# Patient Record
Sex: MALE | Race: WHITE | NOT HISPANIC OR LATINO | Employment: STUDENT | URBAN - METROPOLITAN AREA
[De-identification: names, ages, dates, MRNs, and addresses within clinical notes are randomized per-mention and may not be internally consistent; named-entity substitution may affect disease eponyms.]

---

## 2020-02-27 PROBLEM — Z76.89 ENCOUNTER TO ESTABLISH CARE: Status: ACTIVE | Noted: 2020-02-27

## 2020-02-27 PROBLEM — Z00.00 ANNUAL PHYSICAL EXAM: Status: ACTIVE | Noted: 2020-02-27

## 2020-02-28 ENCOUNTER — OFFICE VISIT (OUTPATIENT)
Dept: INTERNAL MEDICINE CLINIC | Facility: CLINIC | Age: 20
End: 2020-02-28
Payer: COMMERCIAL

## 2020-02-28 VITALS
HEIGHT: 73 IN | OXYGEN SATURATION: 98 % | WEIGHT: 150 LBS | SYSTOLIC BLOOD PRESSURE: 104 MMHG | DIASTOLIC BLOOD PRESSURE: 72 MMHG | BODY MASS INDEX: 19.88 KG/M2 | HEART RATE: 79 BPM | TEMPERATURE: 98 F

## 2020-02-28 DIAGNOSIS — Z00.00 ANNUAL PHYSICAL EXAM: ICD-10-CM

## 2020-02-28 DIAGNOSIS — Z11.1 SCREENING-PULMONARY TB: ICD-10-CM

## 2020-02-28 DIAGNOSIS — Z76.89 ENCOUNTER TO ESTABLISH CARE: Primary | ICD-10-CM

## 2020-02-28 DIAGNOSIS — R25.1 OCCASIONAL TREMORS: ICD-10-CM

## 2020-02-28 DIAGNOSIS — Z28.21 INFLUENZA VACCINATION DECLINED BY PATIENT: ICD-10-CM

## 2020-02-28 PROCEDURE — 99385 PREV VISIT NEW AGE 18-39: CPT | Performed by: INTERNAL MEDICINE

## 2020-02-28 NOTE — PROGRESS NOTES
Assessment/Plan:    Encounter to establish care  - patient has established care with us today and his mother will bring his medical records from his pediatrician  Annual physical exam  - History and physical examination done  - Pt was counseled to eat a heart healthy diet, to drink at least 2 L of water daily, to take a daily multivitamin and to exercise for at least 30 minutes of cardio exercise daily, for at least 5 days a week  - CBC, CMP, TSH and lipid panel have been ordered today will follow up with the results  - he is up-to-date with his tetanus vaccine but did not get the flu shot because the family does not believe in the flu shot  - he was counseled to continue with monthly self testicular exams and to report to the office if he notices any lump, swelling on nodule  - follow up in 1 year for another annual physical exam or sooner if needed  Need for influenza vaccination  - patient declines a flu shot because his family does not believe in it  Normal BMI  - patient's BMI is 19 99  - continue with diet and exercise as usual    Screening for pulmonary TB  - 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 be 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 for pulmonary TB    -will order a QuantiFERON gold test    Tremors  - will check a TSH and fasting glucose     Diagnoses and all orders for this visit:    Encounter to establish care    Annual physical exam  -     CBC and differential; Future  -     Comprehensive metabolic panel; Future  -     TSH, 3rd generation with Free T4 reflex; Future  -     Lipid panel; Future    Screening-pulmonary TB  -     Quantiferon TB Gold Plus; Future    Influenza vaccination declined by patient    Body mass index (BMI) 19 9 or less, adult    Occasional tremors          Subjective:      Patient ID: Dana Vargas is a 23 y o  male  HPI  Patient presents to establish care  He has no health problems except for seasonal allergies    He only takes antihistamines and Flonase during allergy season which is mostly in the spring  He also presents  for an annual physical exam   Last annual physical exam- years ago  Past medical history- seasonal allergies,   Past surgical history- none  Medications- occasional allegra and flonase, used to be on allergy shots  Allergies- NKDA  Diet- mix of balanced diet and junk  He does not drink enough water  Exercise- biking and golf, about an hour a day  Alcohol use- none  Caffeine and soda use-he does not drink coffee, but drinks snapple which contains Tea once a day and also drinks soda about 3-4 a week  Nicotine use- never  Recreational drug use- never  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  Sexual history, STD history and HIV testing- not sexually active, never had an STD, no HIV testing  Gynecological history/Prostate health/testicular health history-self-testicular exam at least once a month  Colonoscopy- N/A  Immunization history- no flu shot - last tdap - 2014  Dental visit- twice a year  Family history- HTN - parents and grandparents on mon's side, DM - grandparents, grandfathers and grandma - MI and heart ds on moms side of the family  grandmoms - pancreatic ca( dad)  and bladder ca( mom)  Today, patient admits to nasal congestion and tremors which he states has been going on for a long time and are worse when he is concentrating to perform a small task  He also admits to arthralgias in the bilateral knees and elbow with a history of elbow fracture  He denies fever, chills, night sweats, chest pain, shortness of breath, palpitations, nausea, vomiting abdominal pain, diarrhea, constipation  There is no family history of essential tremors and patient does not know how alcohol affects the tremors  The following portions of the patient's history were reviewed and updated as appropriate:   He  has no past medical history on file    He   Patient Active Problem List    Diagnosis Date Noted    Screening-pulmonary TB 02/28/2020    Influenza vaccination declined by patient 02/28/2020    Body mass index (BMI) 19 9 or less, adult 02/28/2020    Occasional tremors 02/28/2020    Encounter to establish care 02/27/2020    Annual physical exam 02/27/2020     He  has no past surgical history on file  His family history includes Diabetes in his maternal grandfather, maternal grandmother, paternal grandfather, and paternal grandmother; Heart disease in his maternal grandfather and maternal grandmother; Hypertension in his father, maternal grandfather, maternal grandmother, and mother  He  reports that he has never smoked  He has never used smokeless tobacco  He reports that he does not drink alcohol or use drugs  No current outpatient medications on file  No current facility-administered medications for this visit  No current outpatient medications on file prior to visit  No current facility-administered medications on file prior to visit  He is allergic to grass extracts [gramineae pollens]       Review of Systems   Constitutional: Negative for activity change, chills, fatigue, fever and unexpected weight change  HENT: Positive for congestion  Negative for ear pain, postnasal drip, rhinorrhea, sinus pressure and sore throat  Eyes: Negative for pain  Respiratory: Negative for cough, choking, chest tightness, shortness of breath and wheezing  Cardiovascular: Negative for chest pain, palpitations and leg swelling  Gastrointestinal: Negative for abdominal pain, constipation, diarrhea, nausea and vomiting  Genitourinary: Negative for dysuria and hematuria  Musculoskeletal: Positive for arthralgias (knees and elbows)  Negative for back pain, gait problem, joint swelling, myalgias and neck stiffness  Skin: Negative for pallor and rash  Neurological: Positive for tremors  Negative for dizziness, seizures, syncope, light-headedness and headaches  Hematological: Negative for adenopathy     Psychiatric/Behavioral: Negative for behavioral problems  Objective:      /72 (BP Location: Left arm, Patient Position: Sitting, Cuff Size: Standard)   Pulse 79   Temp 98 °F (36 7 °C) (Oral)   Ht 6' 0 64" (1 845 m)   Wt 68 kg (150 lb)   SpO2 98%   BMI 19 99 kg/m²          Physical Exam   Constitutional: He is oriented to person, place, and time  He appears well-developed and well-nourished  No distress  HENT:   Head: Normocephalic and atraumatic  Right Ear: External ear normal    Left Ear: External ear normal    Nose: Mucosal edema (Nasal mucosa erythema and edema with swollen turbinates) present  Mouth/Throat: Posterior oropharyngeal edema and posterior oropharyngeal erythema present  No oropharyngeal exudate  Eyes: Pupils are equal, round, and reactive to light  Conjunctivae and EOM are normal  Right eye exhibits no discharge  Left eye exhibits no discharge  No scleral icterus  Neck: Normal range of motion  Neck supple  No JVD present  No tracheal deviation present  No thyromegaly present  Cardiovascular: Normal rate, regular rhythm, normal heart sounds and intact distal pulses  Exam reveals no gallop and no friction rub  No murmur heard  Pulmonary/Chest: Effort normal and breath sounds normal  No respiratory distress  He has no wheezes  He has no rales  He exhibits no tenderness  Abdominal: Soft  Bowel sounds are normal  He exhibits no distension and no mass  There is no tenderness  There is no rebound and no guarding  Musculoskeletal: Normal range of motion  He exhibits no edema, tenderness or deformity  Lymphadenopathy:     He has no cervical adenopathy  Neurological: He is alert and oriented to person, place, and time  He has normal reflexes  No cranial nerve deficit  He exhibits normal muscle tone   Coordination normal    Cranial nerves 2-12 intact bilaterally  Muscle strength is 5/5 in all extremities  Sensation is intact in bilateral face, upper and lower extremities  Rapid alternating movements and finger-to-nose pointing test are intact  Deep tendon reflexes at 2+ bilaterally  Gait is intact   Skin: Skin is warm and dry  No rash noted  He is not diaphoretic  No erythema  No pallor  Psychiatric: He has a normal mood and affect  His behavior is normal          No results found for any previous visit

## 2020-02-28 NOTE — PATIENT INSTRUCTIONS

## 2020-02-29 ENCOUNTER — APPOINTMENT (OUTPATIENT)
Dept: LAB | Age: 20
End: 2020-02-29
Payer: COMMERCIAL

## 2020-02-29 DIAGNOSIS — Z00.00 ANNUAL PHYSICAL EXAM: ICD-10-CM

## 2020-02-29 DIAGNOSIS — Z11.1 SCREENING-PULMONARY TB: ICD-10-CM

## 2020-02-29 LAB
ALBUMIN SERPL BCP-MCNC: 4.6 G/DL (ref 3.5–5)
ALP SERPL-CCNC: 102 U/L (ref 46–484)
ALT SERPL W P-5'-P-CCNC: 24 U/L (ref 12–78)
ANION GAP SERPL CALCULATED.3IONS-SCNC: 8 MMOL/L (ref 4–13)
AST SERPL W P-5'-P-CCNC: 13 U/L (ref 5–45)
BASOPHILS # BLD AUTO: 0.04 THOUSANDS/ΜL (ref 0–0.1)
BASOPHILS NFR BLD AUTO: 1 % (ref 0–1)
BILIRUB SERPL-MCNC: 0.86 MG/DL (ref 0.2–1)
BUN SERPL-MCNC: 10 MG/DL (ref 5–25)
CALCIUM SERPL-MCNC: 9.5 MG/DL (ref 8.3–10.1)
CHLORIDE SERPL-SCNC: 106 MMOL/L (ref 100–108)
CHOLEST SERPL-MCNC: 102 MG/DL (ref 50–200)
CO2 SERPL-SCNC: 29 MMOL/L (ref 21–32)
CREAT SERPL-MCNC: 0.78 MG/DL (ref 0.6–1.3)
EOSINOPHIL # BLD AUTO: 0.08 THOUSAND/ΜL (ref 0–0.61)
EOSINOPHIL NFR BLD AUTO: 2 % (ref 0–6)
ERYTHROCYTE [DISTWIDTH] IN BLOOD BY AUTOMATED COUNT: 12.2 % (ref 11.6–15.1)
GFR SERPL CREATININE-BSD FRML MDRD: 131 ML/MIN/1.73SQ M
GLUCOSE P FAST SERPL-MCNC: 83 MG/DL (ref 65–99)
HCT VFR BLD AUTO: 46.9 % (ref 36.5–49.3)
HDLC SERPL-MCNC: 41 MG/DL
HGB BLD-MCNC: 15.5 G/DL (ref 12–17)
IMM GRANULOCYTES # BLD AUTO: 0.01 THOUSAND/UL (ref 0–0.2)
IMM GRANULOCYTES NFR BLD AUTO: 0 % (ref 0–2)
LDLC SERPL CALC-MCNC: 53 MG/DL (ref 0–100)
LYMPHOCYTES # BLD AUTO: 2.08 THOUSANDS/ΜL (ref 0.6–4.47)
LYMPHOCYTES NFR BLD AUTO: 52 % (ref 14–44)
MCH RBC QN AUTO: 31 PG (ref 26.8–34.3)
MCHC RBC AUTO-ENTMCNC: 33 G/DL (ref 31.4–37.4)
MCV RBC AUTO: 94 FL (ref 82–98)
MONOCYTES # BLD AUTO: 0.5 THOUSAND/ΜL (ref 0.17–1.22)
MONOCYTES NFR BLD AUTO: 12 % (ref 4–12)
NEUTROPHILS # BLD AUTO: 1.31 THOUSANDS/ΜL (ref 1.85–7.62)
NEUTS SEG NFR BLD AUTO: 33 % (ref 43–75)
NONHDLC SERPL-MCNC: 61 MG/DL
NRBC BLD AUTO-RTO: 0 /100 WBCS
PLATELET # BLD AUTO: 240 THOUSANDS/UL (ref 149–390)
PMV BLD AUTO: 10.8 FL (ref 8.9–12.7)
POTASSIUM SERPL-SCNC: 4.4 MMOL/L (ref 3.5–5.3)
PROT SERPL-MCNC: 8.2 G/DL (ref 6.4–8.2)
RBC # BLD AUTO: 5 MILLION/UL (ref 3.88–5.62)
SODIUM SERPL-SCNC: 143 MMOL/L (ref 136–145)
TRIGL SERPL-MCNC: 41 MG/DL
TSH SERPL DL<=0.05 MIU/L-ACNC: 0.67 UIU/ML (ref 0.46–3.98)
WBC # BLD AUTO: 4.02 THOUSAND/UL (ref 4.31–10.16)

## 2020-02-29 PROCEDURE — 84443 ASSAY THYROID STIM HORMONE: CPT

## 2020-02-29 PROCEDURE — 36415 COLL VENOUS BLD VENIPUNCTURE: CPT

## 2020-02-29 PROCEDURE — 80061 LIPID PANEL: CPT

## 2020-02-29 PROCEDURE — 80053 COMPREHEN METABOLIC PANEL: CPT

## 2020-02-29 PROCEDURE — 85025 COMPLETE CBC W/AUTO DIFF WBC: CPT

## 2020-02-29 PROCEDURE — 86480 TB TEST CELL IMMUN MEASURE: CPT

## 2020-03-02 LAB
GAMMA INTERFERON BACKGROUND BLD IA-ACNC: 0.02 IU/ML
M TB IFN-G BLD-IMP: NEGATIVE
M TB IFN-G CD4+ BCKGRND COR BLD-ACNC: 0 IU/ML
M TB IFN-G CD4+ BCKGRND COR BLD-ACNC: 0 IU/ML
MITOGEN IGNF BCKGRD COR BLD-ACNC: >10 IU/ML

## 2020-03-04 DIAGNOSIS — D70.8 OTHER NEUTROPENIA (HCC): Primary | ICD-10-CM

## 2020-03-04 NOTE — PROGRESS NOTES
I called and discussed patient's lab results with his mother  He is mildly neutropenic with some lymphocytosis  Will recheck his CBC in 1 month  I will put in the order in Epic and patient's mother will pick it up and patient will have the test done in 1 month  We will follow-up after that

## 2020-04-03 ENCOUNTER — TELEPHONE (OUTPATIENT)
Dept: INTERNAL MEDICINE CLINIC | Facility: CLINIC | Age: 20
End: 2020-04-03

## 2020-08-28 ENCOUNTER — OFFICE VISIT (OUTPATIENT)
Dept: INTERNAL MEDICINE CLINIC | Age: 20
End: 2020-08-28
Payer: COMMERCIAL

## 2020-08-28 VITALS
HEIGHT: 73 IN | OXYGEN SATURATION: 98 % | BODY MASS INDEX: 19.24 KG/M2 | DIASTOLIC BLOOD PRESSURE: 70 MMHG | HEART RATE: 75 BPM | SYSTOLIC BLOOD PRESSURE: 110 MMHG | TEMPERATURE: 99.4 F | WEIGHT: 145.2 LBS

## 2020-08-28 DIAGNOSIS — R04.0 EPISTAXIS, RECURRENT: Primary | ICD-10-CM

## 2020-08-28 DIAGNOSIS — J30.2 SEASONAL ALLERGIES: ICD-10-CM

## 2020-08-28 PROCEDURE — 1036F TOBACCO NON-USER: CPT | Performed by: INTERNAL MEDICINE

## 2020-08-28 PROCEDURE — 99213 OFFICE O/P EST LOW 20 MIN: CPT | Performed by: INTERNAL MEDICINE

## 2020-08-28 PROCEDURE — 3008F BODY MASS INDEX DOCD: CPT | Performed by: INTERNAL MEDICINE

## 2020-08-28 NOTE — PROGRESS NOTES
Assessment/Plan:    Epistaxis, recurrent  - currently resolved  -patient was counseled to avoid poking his finger into his nasal cavity  -he was counseled to use Flonase nasal spray for his seasonal allergies but if he notices that he is bleeding more with his fluticasone, he may use saline nasal spray to keep his nasal cavity from becoming too dry  -he was counseled to stop smoking Hooka  - we will refer patient to ENT    Seasonal allergies   -patient has been counseled to use his Flonase nasal spray for his allergies  -he may also use over-the-counter antihistamine     Diagnoses and all orders for this visit:    Epistaxis, recurrent  -     Ambulatory Referral to Otolaryngology; Future    Seasonal allergies          Subjective:      Patient ID: Juany Durham is a 23 y o  male  HPI   Patient presents with his father with complaints of recurrent nose bleeds for the past 2 years  He states that he has had nosebleeds for years but in the past 2 years and especially the past few weeks the frequency and the severity of nose bleeds has increased  At this time, he bleeds once every week  He states that he does have a history of seasonal allergies for which he uses over-the-counter nasal spray and over-the-counter antihistamine on and off  He also has nasal congestion and occasional rhinorrhea and tends to poke in his nose from time to time  He states that for the past few months he has been congested all the time  Nosebleeds usually start when he has been poking in his nose or when he blows his nose  He states that the most recent episode was yesterday and that he bled for about 20 minutes  He went to the emergency department and was told that he needs to see an ENT surgeon  He denies any history of hypertension but admits that whenever he bleeds the most, he is usually exerting himself with an elevated heart rate    He denies any bleeding from his gums, or any other part of the body and denies any prolonged bleeding when he gets an injury  He denies hematochezia or hematuria  He admits to history of epistaxis in a grandmother  He denies fever, chills, night sweats, chest pain, shortness of breath, dizziness, palpitations, nausea, vomiting, abdominal pain, diarrhea, constipation, myalgias, arthralgias  He admits that he smokes occasionally but the last time he smoked was about 2 months ago  The following portions of the patient's history were reviewed and updated as appropriate:   He  has no past medical history on file  He   Patient Active Problem List    Diagnosis Date Noted    Epistaxis, recurrent 08/28/2020    Seasonal allergies 08/28/2020    Other neutropenia ([facility] [address] ) 03/04/2020    Screening-pulmonary TB 02/28/2020    Influenza vaccination declined by patient 02/28/2020    Body mass index (BMI) 19 9 or less, adult 02/28/2020    Occasional tremors 02/28/2020    Encounter to establish care 02/27/2020    Annual physical exam 02/27/2020     He  has no past surgical history on file  His family history includes Diabetes in his maternal grandfather, maternal grandmother, paternal grandfather, and paternal grandmother; Heart disease in his maternal grandfather and maternal grandmother; Hypertension in his father, maternal grandfather, maternal grandmother, and mother  He  reports that he has never smoked  He has never used smokeless tobacco  He reports that he does not drink alcohol or use drugs  No current outpatient medications on file  No current facility-administered medications for this visit  No current outpatient medications on file prior to visit  No current facility-administered medications on file prior to visit  He is allergic to grass extracts [gramineae pollens]       Review of Systems   Constitutional: Negative for activity change, chills, fatigue, fever and unexpected weight change  HENT: Positive for congestion and nosebleeds (bad for the past couple of years)   Negative for ear pain, postnasal drip, rhinorrhea, sinus pressure and sore throat  Eyes: Negative for pain  Respiratory: Negative for cough, choking, chest tightness, shortness of breath and wheezing  Cardiovascular: Negative for chest pain, palpitations and leg swelling  Gastrointestinal: Negative for abdominal pain, constipation, diarrhea, nausea and vomiting  Genitourinary: Negative for dysuria and hematuria  Musculoskeletal: Negative for arthralgias, back pain, gait problem, joint swelling, myalgias and neck stiffness  Skin: Negative for pallor and rash  Neurological: Negative for dizziness, tremors, seizures, syncope, light-headedness and headaches  Hematological: Negative for adenopathy  Psychiatric/Behavioral: Negative for behavioral problems  Objective:      /70 (BP Location: Left arm, Patient Position: Sitting, Cuff Size: Adult)   Pulse 75   Temp 99 4 °F (37 4 °C) (Temporal)   Ht 6' 0 64" (1 845 m)   Wt 65 9 kg (145 lb 3 2 oz) Comment: shoes on  SpO2 98%   BMI 19 35 kg/m²          Physical Exam  Constitutional:       General: He is not in acute distress  Appearance: He is well-developed  He is not diaphoretic  HENT:      Head: Normocephalic and atraumatic  Right Ear: External ear normal       Left Ear: External ear normal       Nose: Mucosal edema (Mucosal edema with congestion in the bilateral nasal cavity and dried blood and a scab on the septal aspect of the left nasal cavity and on the lateral wall of the right nasal cavity ) and congestion present  Mouth/Throat:      Mouth: Mucous membranes are dry  Pharynx: No oropharyngeal exudate  Comments: Dry mucous membranes  Eyes:      General: No scleral icterus  Right eye: No discharge  Left eye: No discharge  Conjunctiva/sclera: Conjunctivae normal       Pupils: Pupils are equal, round, and reactive to light  Neck:      Musculoskeletal: Normal range of motion and neck supple  Thyroid: No thyromegaly  Vascular: No JVD  Trachea: No tracheal deviation  Cardiovascular:      Rate and Rhythm: Normal rate and regular rhythm  Heart sounds: Normal heart sounds  No murmur  No friction rub  No gallop  Pulmonary:      Effort: Pulmonary effort is normal  No respiratory distress  Breath sounds: Normal breath sounds  No wheezing or rales  Chest:      Chest wall: No tenderness  Abdominal:      General: Bowel sounds are normal  There is no distension  Palpations: Abdomen is soft  There is no mass  Tenderness: There is no abdominal tenderness  There is no guarding or rebound  Musculoskeletal: Normal range of motion  General: No tenderness or deformity  Lymphadenopathy:      Cervical: No cervical adenopathy  Skin:     General: Skin is warm and dry  Coloration: Skin is not pale  Findings: No erythema or rash  Neurological:      Mental Status: He is alert and oriented to person, place, and time  Cranial Nerves: No cranial nerve deficit  Motor: No abnormal muscle tone  Coordination: Coordination normal       Deep Tendon Reflexes: Reflexes are normal and symmetric     Psychiatric:         Behavior: Behavior normal            Appointment on 02/29/2020   Component Date Value Ref Range Status    WBC 02/29/2020 4 02* 4 31 - 10 16 Thousand/uL Final    RBC 02/29/2020 5 00  3 88 - 5 62 Million/uL Final    Hemoglobin 02/29/2020 15 5  12 0 - 17 0 g/dL Final    Hematocrit 02/29/2020 46 9  36 5 - 49 3 % Final    MCV 02/29/2020 94  82 - 98 fL Final    MCH 02/29/2020 31 0  26 8 - 34 3 pg Final    MCHC 02/29/2020 33 0  31 4 - 37 4 g/dL Final    RDW 02/29/2020 12 2  11 6 - 15 1 % Final    MPV 02/29/2020 10 8  8 9 - 12 7 fL Final    Platelets 27/47/6122 240  149 - 390 Thousands/uL Final    nRBC 02/29/2020 0  /100 WBCs Final    Neutrophils Relative 02/29/2020 33* 43 - 75 % Final    Immat GRANS % 02/29/2020 0  0 - 2 % Final    Lymphocytes Relative 02/29/2020 52* 14 - 44 % Final    Monocytes Relative 02/29/2020 12  4 - 12 % Final    Eosinophils Relative 02/29/2020 2  0 - 6 % Final    Basophils Relative 02/29/2020 1  0 - 1 % Final    Neutrophils Absolute 02/29/2020 1 31* 1 85 - 7 62 Thousands/µL Final    Immature Grans Absolute 02/29/2020 0 01  0 00 - 0 20 Thousand/uL Final    Lymphocytes Absolute 02/29/2020 2 08  0 60 - 4 47 Thousands/µL Final    Monocytes Absolute 02/29/2020 0 50  0 17 - 1 22 Thousand/µL Final    Eosinophils Absolute 02/29/2020 0 08  0 00 - 0 61 Thousand/µL Final    Basophils Absolute 02/29/2020 0 04  0 00 - 0 10 Thousands/µL Final    Sodium 02/29/2020 143  136 - 145 mmol/L Final    Potassium 02/29/2020 4 4  3 5 - 5 3 mmol/L Final    Chloride 02/29/2020 106  100 - 108 mmol/L Final    CO2 02/29/2020 29  21 - 32 mmol/L Final    ANION GAP 02/29/2020 8  4 - 13 mmol/L Final    BUN 02/29/2020 10  5 - 25 mg/dL Final    Creatinine 02/29/2020 0 78  0 60 - 1 30 mg/dL Final    Standardized to IDMS reference method    Glucose, Fasting 02/29/2020 83  65 - 99 mg/dL Final      Specimen collection should occur prior to Sulfasalazine administration due to the potential for falsely depressed results  Specimen collection should occur prior to Sulfapyridine administration due to the potential for falsely elevated results   Calcium 02/29/2020 9 5  8 3 - 10 1 mg/dL Final    AST 02/29/2020 13  5 - 45 U/L Final      Specimen collection should occur prior to Sulfasalazine administration due to the potential for falsely depressed results   ALT 02/29/2020 24  12 - 78 U/L Final      Specimen collection should occur prior to Sulfasalazine and/or Sulfapyridine administration due to the potential for falsely depressed results       Alkaline Phosphatase 02/29/2020 102  46 - 484 U/L Final    Total Protein 02/29/2020 8 2  6 4 - 8 2 g/dL Final    Albumin 02/29/2020 4 6  3 5 - 5 0 g/dL Final    Total Bilirubin 02/29/2020 0 86 0 20 - 1 00 mg/dL Final    eGFR 02/29/2020 131  ml/min/1 73sq m Final    TSH 3RD GENERATON 02/29/2020 0 667  0 463 - 3 980 uIU/mL Final      Using supplements with high doses of biotin 20 to more than 300 times greater than the adequate daily intake for adults of 30 mcg/day as established by the [employer], can cause falsely depress results   Cholesterol 02/29/2020 102  50 - 200 mg/dL Final      Cholesterol:       Desirable         <200 mg/dl       Borderline         200-239 mg/dl       High              >239           Triglycerides 02/29/2020 41  <=150 mg/dL Final      Triglyceride:     Normal          <150 mg/dl     Borderline High 150-199 mg/dl     High            200-499 mg/dl        Very High       >499 mg/dl    Specimen collection should occur prior to N-Acetylcysteine or Metamizole administration due to the potential for falsely depressed results   HDL, Direct 02/29/2020 41  >=40 mg/dL Final      HDL Cholesterol:       Low     <41 mg/dL  Specimen collection should occur prior to Metamizole administration due to the potential for falsley depressed results   LDL Calculated 02/29/2020 53  0 - 100 mg/dL Final      LDL Cholesterol:     Optimal           <100 mg/dl     Near Optimal      100-129 mg/dl     Above Optimal       Borderline High 130-159 mg/dl       High            160-189 mg/dl       Very High       >189 mg/dl         This screening LDL is a calculated result  It does not have the accuracy of the Direct Measured LDL in the monitoring of patients with hyperlipidemia and/or statin therapy  Direct Measure LDL (GQF056) must be ordered separately in these patients      Non-HDL-Chol (CHOL-HDL) 02/29/2020 61  mg/dl Final    QFT Nil 02/29/2020 0 02  0 - 8 0 IU/ml Final    QFT TB1-NIL 02/29/2020 0 00  IU/ml Final    QFT TB2-NIL 02/29/2020 0 00  IU/ml Final    QFT Mitogen-NIL 02/29/2020 >10 00  IU/ml Final    QFT Final Interpretation 02/29/2020 Negative  Negative Final    No Interferon-gamma response to M  tuberculosis antigens detected  Infection with M  tuberculosis is unlikely  A single negative result does not exclude infection with M  tuberculosis  In patients at high risk for M  tuberculosis infection, a second test should be considered in accordance with the 2017 ATS/IDSA/CDC Clinical Practice Guidelines for Diagnosis of Tuberculosis in Adults and Children  False negative results can be a result of incorrect blood sample collection or handling of the specimen affecting lymphocyte function

## 2021-04-05 DIAGNOSIS — Z23 ENCOUNTER FOR IMMUNIZATION: ICD-10-CM

## 2022-03-04 ENCOUNTER — OFFICE VISIT (OUTPATIENT)
Dept: INTERNAL MEDICINE CLINIC | Age: 22
End: 2022-03-04
Payer: COMMERCIAL

## 2022-03-04 VITALS
DIASTOLIC BLOOD PRESSURE: 72 MMHG | WEIGHT: 156.2 LBS | HEIGHT: 73 IN | TEMPERATURE: 97.9 F | HEART RATE: 82 BPM | OXYGEN SATURATION: 99 % | SYSTOLIC BLOOD PRESSURE: 100 MMHG | BODY MASS INDEX: 20.7 KG/M2

## 2022-03-04 DIAGNOSIS — D70.8 OTHER NEUTROPENIA (HCC): ICD-10-CM

## 2022-03-04 DIAGNOSIS — Z00.00 ANNUAL PHYSICAL EXAM: Primary | ICD-10-CM

## 2022-03-04 PROBLEM — Z76.89 ENCOUNTER TO ESTABLISH CARE: Status: RESOLVED | Noted: 2020-02-27 | Resolved: 2022-03-04

## 2022-03-04 PROCEDURE — 3725F SCREEN DEPRESSION PERFORMED: CPT | Performed by: INTERNAL MEDICINE

## 2022-03-04 PROCEDURE — 99395 PREV VISIT EST AGE 18-39: CPT | Performed by: INTERNAL MEDICINE

## 2022-03-04 PROCEDURE — 1036F TOBACCO NON-USER: CPT | Performed by: INTERNAL MEDICINE

## 2022-03-04 PROCEDURE — 3008F BODY MASS INDEX DOCD: CPT | Performed by: INTERNAL MEDICINE

## 2022-03-04 NOTE — PATIENT INSTRUCTIONS

## 2022-03-04 NOTE — PROGRESS NOTES
Assessment/Plan:    Annual physical exam  - History and physical examination done  - Pt was counseled to eat a heart healthy diet, to drink at least 2 L of water daily, to take a daily multivitamin and to exercise for at least 30 minutes of cardio exercise daily, for at least 5 days a week  - CBC, CMP, TSH and lipid panel were done 2 years ago and were essentially normal with exception of leukopenia   -  He states that he is up-to-date with his COVID vaccine and believes that he is up-to-date with his Tdap but not the flu shot  He was counseled to rethink getting the flu shot especially when he is older as the influenza infection can be severe in geriatric patients  -patient had a QuantiFERON gold TB test done on February 29th, 2020 which was negative  - follow up in one year for an annual physical exam or sooner as needed  Neutropenia  - last CBC done on February 29th, 2020 showed a WBC of 4 02 with relative neutropenia of 33% and decreased absolute neutrophil count of 1 31  - will order a repeat CBC       Diagnoses and all orders for this visit:    Annual physical exam  -     CBC and differential; Future    Other neutropenia (HCC)  -     CBC and differential; Future          Subjective:      Patient ID: Nilson Hawkins is a 24 y o  male      HPI  Patient presents for an annual physical exam     Last annual physical exam- 2/28/2020    Past medical history- seasonal Allergies    Past surgical history- none    Medications- none    Allergies- NKDA    Diet- a mixture of balanced diet and junk and drinks not so much water    Exercise- not much now, but more in the summer    Alcohol use- infrequently, max of one drink    Caffeine and soda use- tea and soda, a lot    Nicotine use- never    Recreational drug use- none    Work- lawn care and  work    Sexual history, STD history and HIV testing- not at the moment, STD hx - never, HIV testing - never    Gynecological history/Prostate health/testicular health history- does testicular self exam    Colonoscopy- N/A    Immunization history- up to date with covid + booster but not the flu and thinks he got the tetanus    Dental visit- every six months usually    Vision-normal vision    Family history-  HTN - parents and grandparents on mon's side,   DM - grandparents, grandfathers and grandma - MI and heart ds on moms side of the family  grandmoms - pancreatic ca( dad)  and bladder ca( mom)    Today, patient no complaints  He works in his [employer] and needs a physical to determine that he is able to work in the  every 2nd year  He states that he had a PPD test many years ago which was negative  He admits to occasional nasal congestion on and off and has a history of seasonal allergies  He denies fever, chills, night sweats, headache, dizziness, weight loss,  Rhinorrhea, earache, sinus pain or pressure, sore throat, cough, chest pain, shortness of breath, palpitations, nausea, vomiting, abdominal pain, diarrhea, constipation, hematochezia, hematuria, arthralgias, myalgias, polydipsia, polyuria, cold intolerance, heat intolerance, feelings of anxiety or depression or insomnia  The following portions of the patient's history were reviewed and updated as appropriate:   He  has no past medical history on file  He   Patient Active Problem List    Diagnosis Date Noted    Epistaxis, recurrent 08/28/2020    Seasonal allergies 08/28/2020    Other neutropenia ([facility] [address] ) 03/04/2020    Screening-pulmonary TB 02/28/2020    Influenza vaccination declined by patient 02/28/2020    Body mass index (BMI) 19 9 or less, adult 02/28/2020    Occasional tremors 02/28/2020    Annual physical exam 02/27/2020     He  has no past surgical history on file    His family history includes Diabetes in his maternal grandfather, maternal grandmother, paternal grandfather, and paternal grandmother; Heart disease in his maternal grandfather and maternal grandmother; Hypertension in his father, maternal grandfather, maternal grandmother, and mother  He  reports that he has never smoked  He has never used smokeless tobacco  He reports that he does not drink alcohol and does not use drugs  No current outpatient medications on file  No current facility-administered medications for this visit  No current outpatient medications on file prior to visit  No current facility-administered medications on file prior to visit  He is allergic to grass extracts [gramineae pollens]       Review of Systems   Constitutional: Negative for activity change, chills, fatigue, fever and unexpected weight change  HENT: Positive for congestion (occasionally)  Negative for ear pain, postnasal drip, rhinorrhea, sinus pressure and sore throat  Eyes: Negative for pain  Respiratory: Negative for cough, choking, chest tightness, shortness of breath and wheezing  Cardiovascular: Negative for chest pain, palpitations and leg swelling  Gastrointestinal: Negative for abdominal pain, constipation, diarrhea, nausea and vomiting  Genitourinary: Negative for dysuria and hematuria  Musculoskeletal: Negative for arthralgias, back pain, gait problem, joint swelling, myalgias and neck stiffness  Skin: Negative for pallor and rash  Neurological: Negative for dizziness, tremors, seizures, syncope, light-headedness and headaches  Hematological: Negative for adenopathy  Psychiatric/Behavioral: Negative for behavioral problems  Objective:      /72 (BP Location: Left arm, Patient Position: Sitting, Cuff Size: Adult)   Pulse 82   Temp 97 9 °F (36 6 °C) (Temporal)   Ht 6' 1 25" (1 861 m)   Wt 70 9 kg (156 lb 3 2 oz)   SpO2 99% Comment: room air  BMI 20 47 kg/m²          Physical Exam  Constitutional:       General: He is not in acute distress  Appearance: He is well-developed  He is not diaphoretic  HENT:      Head: Normocephalic and atraumatic        Right Ear: External ear normal  Left Ear: External ear normal       Nose: Mucosal edema and congestion present  Mouth/Throat:      Mouth: Mucous membranes are dry  Pharynx: Posterior oropharyngeal erythema present  No oropharyngeal exudate  Eyes:      General: No scleral icterus  Right eye: No discharge  Left eye: No discharge  Conjunctiva/sclera: Conjunctivae normal       Pupils: Pupils are equal, round, and reactive to light  Neck:      Thyroid: No thyromegaly  Vascular: No JVD  Trachea: No tracheal deviation  Cardiovascular:      Rate and Rhythm: Normal rate and regular rhythm  Heart sounds: Normal heart sounds  No murmur heard  No friction rub  No gallop  Pulmonary:      Effort: Pulmonary effort is normal  No respiratory distress  Breath sounds: Normal breath sounds  No wheezing or rales  Chest:      Chest wall: No tenderness  Abdominal:      General: Bowel sounds are normal  There is no distension  Palpations: Abdomen is soft  There is no mass  Tenderness: There is no abdominal tenderness  There is no guarding or rebound  Musculoskeletal:         General: No tenderness or deformity  Normal range of motion  Cervical back: Normal range of motion and neck supple  Lymphadenopathy:      Cervical: No cervical adenopathy  Skin:     General: Skin is warm and dry  Coloration: Skin is not pale  Findings: No erythema or rash  Neurological:      Mental Status: He is alert and oriented to person, place, and time  Cranial Nerves: No cranial nerve deficit  Motor: No abnormal muscle tone  Coordination: Coordination normal       Deep Tendon Reflexes: Reflexes are normal and symmetric        Comments: Cranial nerves 2-12 are intact bilaterally  Muscle strength is 5/5 in all extremities  Sensation is intact in bilateral face and extremities  Rapid alternating movement and finger-to-nose pointing test intact   Deep tendon reflexes are 2+ bilaterally  Gait is intact       Psychiatric:         Behavior: Behavior normal            No visits with results within 12 Month(s) from this visit     Latest known visit with results is:   Appointment on 02/29/2020   Component Date Value Ref Range Status    WBC 02/29/2020 4 02* 4 31 - 10 16 Thousand/uL Final    RBC 02/29/2020 5 00  3 88 - 5 62 Million/uL Final    Hemoglobin 02/29/2020 15 5  12 0 - 17 0 g/dL Final    Hematocrit 02/29/2020 46 9  36 5 - 49 3 % Final    MCV 02/29/2020 94  82 - 98 fL Final    MCH 02/29/2020 31 0  26 8 - 34 3 pg Final    MCHC 02/29/2020 33 0  31 4 - 37 4 g/dL Final    RDW 02/29/2020 12 2  11 6 - 15 1 % Final    MPV 02/29/2020 10 8  8 9 - 12 7 fL Final    Platelets 41/51/1592 240  149 - 390 Thousands/uL Final    nRBC 02/29/2020 0  /100 WBCs Final    Neutrophils Relative 02/29/2020 33* 43 - 75 % Final    Immat GRANS % 02/29/2020 0  0 - 2 % Final    Lymphocytes Relative 02/29/2020 52* 14 - 44 % Final    Monocytes Relative 02/29/2020 12  4 - 12 % Final    Eosinophils Relative 02/29/2020 2  0 - 6 % Final    Basophils Relative 02/29/2020 1  0 - 1 % Final    Neutrophils Absolute 02/29/2020 1 31* 1 85 - 7 62 Thousands/µL Final    Immature Grans Absolute 02/29/2020 0 01  0 00 - 0 20 Thousand/uL Final    Lymphocytes Absolute 02/29/2020 2 08  0 60 - 4 47 Thousands/µL Final    Monocytes Absolute 02/29/2020 0 50  0 17 - 1 22 Thousand/µL Final    Eosinophils Absolute 02/29/2020 0 08  0 00 - 0 61 Thousand/µL Final    Basophils Absolute 02/29/2020 0 04  0 00 - 0 10 Thousands/µL Final    Sodium 02/29/2020 143  136 - 145 mmol/L Final    Potassium 02/29/2020 4 4  3 5 - 5 3 mmol/L Final    Chloride 02/29/2020 106  100 - 108 mmol/L Final    CO2 02/29/2020 29  21 - 32 mmol/L Final    ANION GAP 02/29/2020 8  4 - 13 mmol/L Final    BUN 02/29/2020 10  5 - 25 mg/dL Final    Creatinine 02/29/2020 0 78  0 60 - 1 30 mg/dL Final    Standardized to IDMS reference method    Glucose, Fasting 02/29/2020 83  65 - 99 mg/dL Final      Specimen collection should occur prior to Sulfasalazine administration due to the potential for falsely depressed results  Specimen collection should occur prior to Sulfapyridine administration due to the potential for falsely elevated results   Calcium 02/29/2020 9 5  8 3 - 10 1 mg/dL Final    AST 02/29/2020 13  5 - 45 U/L Final      Specimen collection should occur prior to Sulfasalazine administration due to the potential for falsely depressed results   ALT 02/29/2020 24  12 - 78 U/L Final      Specimen collection should occur prior to Sulfasalazine and/or Sulfapyridine administration due to the potential for falsely depressed results   Alkaline Phosphatase 02/29/2020 102  46 - 484 U/L Final    Total Protein 02/29/2020 8 2  6 4 - 8 2 g/dL Final    Albumin 02/29/2020 4 6  3 5 - 5 0 g/dL Final    Total Bilirubin 02/29/2020 0 86  0 20 - 1 00 mg/dL Final    eGFR 02/29/2020 131  ml/min/1 73sq m Final    TSH 3RD GENERATON 02/29/2020 0 667  0 463 - 3 980 uIU/mL Final      Using supplements with high doses of biotin 20 to more than 300 times greater than the adequate daily intake for adults of 30 mcg/day as established by the [place] [facility], can cause falsely depress results   Cholesterol 02/29/2020 102  50 - 200 mg/dL Final      Cholesterol:       Desirable         <200 mg/dl       Borderline         200-239 mg/dl       High              >239           Triglycerides 02/29/2020 41  <=150 mg/dL Final      Triglyceride:     Normal          <150 mg/dl     Borderline High 150-199 mg/dl     High            200-499 mg/dl        Very High       >499 mg/dl    Specimen collection should occur prior to N-Acetylcysteine or Metamizole administration due to the potential for falsely depressed results      HDL, Direct 02/29/2020 41  >=40 mg/dL Final      HDL Cholesterol:       Low     <41 mg/dL  Specimen collection should occur prior to Metamizole administration due to the potential for falsley depressed results   LDL Calculated 02/29/2020 53  0 - 100 mg/dL Final      LDL Cholesterol:     Optimal           <100 mg/dl     Near Optimal      100-129 mg/dl     Above Optimal       Borderline High 130-159 mg/dl       High            160-189 mg/dl       Very High       >189 mg/dl         This screening LDL is a calculated result  It does not have the accuracy of the Direct Measured LDL in the monitoring of patients with hyperlipidemia and/or statin therapy  Direct Measure LDL (JJK992) must be ordered separately in these patients   Non-HDL-Chol (CHOL-HDL) 02/29/2020 61  mg/dl Final    QFT Nil 02/29/2020 0 02  0 - 8 0 IU/ml Final    QFT TB1-NIL 02/29/2020 0 00  IU/ml Final    QFT TB2-NIL 02/29/2020 0 00  IU/ml Final    QFT Mitogen-NIL 02/29/2020 >10 00  IU/ml Final    QFT Final Interpretation 02/29/2020 Negative  Negative Final    No Interferon-gamma response to M  tuberculosis antigens detected  Infection with M  tuberculosis is unlikely  A single negative result does not exclude infection with M  tuberculosis  In patients at high risk for M  tuberculosis infection, a second test should be considered in accordance with the 2017 ATS/IDSA/CDC Clinical Practice Guidelines for Diagnosis of Tuberculosis in Adults and Children  False negative results can be a result of incorrect blood sample collection or handling of the specimen affecting lymphocyte function

## 2023-01-27 ENCOUNTER — APPOINTMENT (OUTPATIENT)
Dept: LAB | Age: 23
End: 2023-01-27

## 2023-01-27 DIAGNOSIS — Z00.00 ANNUAL PHYSICAL EXAM: ICD-10-CM

## 2023-01-27 DIAGNOSIS — D70.8 OTHER NEUTROPENIA (HCC): ICD-10-CM

## 2023-01-27 LAB
BASOPHILS # BLD AUTO: 0.05 THOUSANDS/ÂΜL (ref 0–0.1)
BASOPHILS NFR BLD AUTO: 1 % (ref 0–1)
EOSINOPHIL # BLD AUTO: 0.07 THOUSAND/ÂΜL (ref 0–0.61)
EOSINOPHIL NFR BLD AUTO: 1 % (ref 0–6)
ERYTHROCYTE [DISTWIDTH] IN BLOOD BY AUTOMATED COUNT: 12.2 % (ref 11.6–15.1)
HCT VFR BLD AUTO: 45.8 % (ref 36.5–49.3)
HGB BLD-MCNC: 15.6 G/DL (ref 12–17)
IMM GRANULOCYTES # BLD AUTO: 0.03 THOUSAND/UL (ref 0–0.2)
IMM GRANULOCYTES NFR BLD AUTO: 1 % (ref 0–2)
LYMPHOCYTES # BLD AUTO: 1.92 THOUSANDS/ÂΜL (ref 0.6–4.47)
LYMPHOCYTES NFR BLD AUTO: 29 % (ref 14–44)
MCH RBC QN AUTO: 32.4 PG (ref 26.8–34.3)
MCHC RBC AUTO-ENTMCNC: 34.1 G/DL (ref 31.4–37.4)
MCV RBC AUTO: 95 FL (ref 82–98)
MONOCYTES # BLD AUTO: 0.68 THOUSAND/ÂΜL (ref 0.17–1.22)
MONOCYTES NFR BLD AUTO: 10 % (ref 4–12)
NEUTROPHILS # BLD AUTO: 3.81 THOUSANDS/ÂΜL (ref 1.85–7.62)
NEUTS SEG NFR BLD AUTO: 58 % (ref 43–75)
NRBC BLD AUTO-RTO: 0 /100 WBCS
PLATELET # BLD AUTO: 267 THOUSANDS/UL (ref 149–390)
PMV BLD AUTO: 10.4 FL (ref 8.9–12.7)
RBC # BLD AUTO: 4.82 MILLION/UL (ref 3.88–5.62)
WBC # BLD AUTO: 6.56 THOUSAND/UL (ref 4.31–10.16)

## 2023-03-03 ENCOUNTER — RA CDI HCC (OUTPATIENT)
Dept: OTHER | Facility: HOSPITAL | Age: 23
End: 2023-03-03

## 2023-03-03 NOTE — PROGRESS NOTES
[facility]  coding opportunities       Chart reviewed, no opportunity found: CHART REVIEWED, NO OPPORTUNITY FOUND        Patients Insurance        Commercial Insurance: [address] [place] [address]

## 2023-03-10 ENCOUNTER — OFFICE VISIT (OUTPATIENT)
Dept: INTERNAL MEDICINE CLINIC | Age: 23
End: 2023-03-10

## 2023-03-10 VITALS
HEART RATE: 73 BPM | SYSTOLIC BLOOD PRESSURE: 104 MMHG | HEIGHT: 74 IN | OXYGEN SATURATION: 99 % | BODY MASS INDEX: 19.7 KG/M2 | WEIGHT: 153.5 LBS | DIASTOLIC BLOOD PRESSURE: 78 MMHG | TEMPERATURE: 97.8 F

## 2023-03-10 DIAGNOSIS — Z00.00 ANNUAL PHYSICAL EXAM: Primary | ICD-10-CM

## 2023-03-10 DIAGNOSIS — Z28.21 INFLUENZA VACCINATION DECLINED BY PATIENT: ICD-10-CM

## 2023-03-10 DIAGNOSIS — D70.8 OTHER NEUTROPENIA (HCC): ICD-10-CM

## 2023-03-10 NOTE — PROGRESS NOTES
Assessment/Plan:    Annual physical examination  - History and physical examination done  - Pt was counseled to eat a heart healthy diet, to drink at least 2 L of water daily, to take a daily multivitamin and to exercise for at least 30 minutes of cardio exercise daily, for at least 5 days a week  - CBC was done 2 months ago and results were reviewed with patient   -He is up-to-date with his COVID-vaccine x3 but not the flu shot or the Tdap  He was counseled to rethink the flu shot when he is older as influenza infection can be severe in geriatric patients  - testicular self-exam was taught  - follow up in 1 year for an annual physical exam or sooner as needed  Neutropenia  -Resolved, CBC done on 1/27/2023 was totally normal  -We will continue to monitor patient clinically     Diagnoses and all orders for this visit:    Annual physical exam    Other neutropenia ([facility] [address] )    Influenza vaccination declined by patient          Depression Screening and Follow-up Plan: Patient was screened for depression during today's encounter  They screened negative with a PHQ-2 score of 0  Subjective:      Patient ID: Liliana Hood is a 25 y o  male      HPI    Patient presents for an annual physical exam     Last annual physical exam- 3/4/2022    Past medical history-seasonal allergies     Past surgical history-  None    Medications- none    Allergies- NKDA    Diet- mixture of balanced and junk food, drinks not much water in the winter    Exercise- not so much     Alcohol use- social drinker, max of 2    Caffeine and soda use- iced tea and soda and not much coffee    Nicotine use- never    Recreational drug use- never    Work-  Lawn care and day care    Sexual history, STD history and HIV testing- monogamous with female partner, std hx - never, hiv testing - never    Gynecological history/Prostate health/testicular health history- testicular self exam taught    Colonoscopy- N/A    Immunization history- up to date with the covid shot x 3, does not get the flu shot and not the tdap    Dental visit- every 6 months     Vision- normal vison     Family history-  HTN - parents and grandparents on mon's side,   DM - grandparents,   grandfathers and grandma - MI and heart ds on moms side of the family  grandmoms - pancreatic ca( dad)  and bladder ca( mom)    Today, patient has no complaints  He admits to nasal congestion as well as intermittent bilateral knee arthralgia that varies  He had significant growing pains in his knees but denies any knee swelling or stiffness or any other joint pain  He denies fever, chills, night sweats, headache, dizziness, rhinorrhea, postnasal drip, sore throat, cough, chest pain, shortness of breath, palpitations, nausea, vomiting, abdominal pain, diarrhea, constipation, myalgias, hematochezia, hematuria, melena stools, feelings of anxiety, depression or insomnia  The following portions of the patient's history were reviewed and updated as appropriate:   He  has no past medical history on file  He   Patient Active Problem List    Diagnosis Date Noted   • Epistaxis, recurrent 08/28/2020   • Seasonal allergies 08/28/2020   • Other neutropenia ([facility] [address] ) 03/04/2020   • Screening-pulmonary TB 02/28/2020   • Influenza vaccination declined by patient 02/28/2020   • Body mass index (BMI) 19 9 or less, adult 02/28/2020   • Occasional tremors 02/28/2020   • Annual physical exam 02/27/2020     He  has no past surgical history on file  His family history includes Diabetes in his maternal grandfather, maternal grandmother, paternal grandfather, and paternal grandmother; Heart disease in his maternal grandfather and maternal grandmother; Hypertension in his father, maternal grandfather, maternal grandmother, and mother  He  reports that he has never smoked  He has never used smokeless tobacco  He reports that he does not drink alcohol and does not use drugs  No current outpatient medications on file       No current facility-administered medications for this visit  No current outpatient medications on file prior to visit  No current facility-administered medications on file prior to visit  He is allergic to grass extracts [gramineae pollens]       Review of Systems   Constitutional: Negative for activity change, chills, fatigue, fever and unexpected weight change  HENT: Positive for congestion (occasionally likely 2/2 allergies - takes allegra)  Negative for ear pain, postnasal drip, rhinorrhea, sinus pressure and sore throat  Eyes: Negative for pain  Respiratory: Negative for cough, choking, chest tightness, shortness of breath and wheezing  Cardiovascular: Negative for chest pain, palpitations and leg swelling  Gastrointestinal: Negative for abdominal pain, constipation, diarrhea, nausea and vomiting  Genitourinary: Negative for dysuria and hematuria  Musculoskeletal: Positive for arthralgias (knee pain on and off- had bad growing pains as a young kid and PGM and P uncle have arthritis )  Negative for back pain, gait problem, joint swelling, myalgias and neck stiffness  Skin: Negative for pallor and rash  Neurological: Negative for dizziness, tremors, seizures, syncope, light-headedness and headaches  Hematological: Negative for adenopathy  Psychiatric/Behavioral: Negative for behavioral problems, dysphoric mood and sleep disturbance  The patient is not nervous/anxious  Objective:      /78 (BP Location: Left arm, Patient Position: Sitting, Cuff Size: Large)   Pulse 73   Temp 97 8 °F (36 6 °C) (Temporal)   Ht 6' 2" (1 88 m)   Wt 69 6 kg (153 lb 8 oz)   SpO2 99% Comment: room air  BMI 19 71 kg/m²          Physical Exam  Constitutional:       General: He is not in acute distress  Appearance: He is well-developed  He is not diaphoretic  HENT:      Head: Normocephalic and atraumatic        Right Ear: External ear normal       Left Ear: External ear normal       Nose: Mucosal edema and congestion present  Mouth/Throat:      Mouth: Mucous membranes are dry  Pharynx: Posterior oropharyngeal erythema present  No oropharyngeal exudate  Eyes:      General: No scleral icterus  Right eye: No discharge  Left eye: No discharge  Conjunctiva/sclera: Conjunctivae normal       Pupils: Pupils are equal, round, and reactive to light  Neck:      Thyroid: No thyromegaly  Vascular: No JVD  Trachea: No tracheal deviation  Cardiovascular:      Rate and Rhythm: Normal rate and regular rhythm  Heart sounds: Normal heart sounds  No murmur heard  No friction rub  No gallop  Pulmonary:      Effort: Pulmonary effort is normal  No respiratory distress  Breath sounds: Normal breath sounds  No wheezing or rales  Chest:      Chest wall: No tenderness  Abdominal:      General: Bowel sounds are normal  There is no distension  Palpations: Abdomen is soft  There is no mass  Tenderness: There is no abdominal tenderness  There is no guarding or rebound  Musculoskeletal:         General: No tenderness or deformity  Normal range of motion  Cervical back: Normal range of motion and neck supple  Lymphadenopathy:      Cervical: No cervical adenopathy  Skin:     General: Skin is warm and dry  Coloration: Skin is not pale  Findings: No erythema or rash  Neurological:      Mental Status: He is alert and oriented to person, place, and time  Cranial Nerves: No cranial nerve deficit  Motor: No abnormal muscle tone  Coordination: Coordination normal       Deep Tendon Reflexes: Reflexes are normal and symmetric        Comments: Cranial nerves 2-12 are intact bilaterally  Muscle strength is 5/5 in all extremities  Sensation is intact in bilateral face and extremities  Rapid alternating movement and finger-to-nose pointing test intact   Deep tendon reflexes are 2+ bilaterally  Gait is intact       Psychiatric: Behavior: Behavior normal            Appointment on 01/27/2023   Component Date Value Ref Range Status   • WBC 01/27/2023 6 56  4 31 - 10 16 Thousand/uL Final   • RBC 01/27/2023 4 82  3 88 - 5 62 Million/uL Final   • Hemoglobin 01/27/2023 15 6  12 0 - 17 0 g/dL Final   • Hematocrit 01/27/2023 45 8  36 5 - 49 3 % Final   • MCV 01/27/2023 95  82 - 98 fL Final   • MCH 01/27/2023 32 4  26 8 - 34 3 pg Final   • MCHC 01/27/2023 34 1  31 4 - 37 4 g/dL Final   • RDW 01/27/2023 12 2  11 6 - 15 1 % Final   • MPV 01/27/2023 10 4  8 9 - 12 7 fL Final   • Platelets 37/02/6209 267  149 - 390 Thousands/uL Final   • nRBC 01/27/2023 0  /100 WBCs Final   • Neutrophils Relative 01/27/2023 58  43 - 75 % Final   • Immat GRANS % 01/27/2023 1  0 - 2 % Final   • Lymphocytes Relative 01/27/2023 29  14 - 44 % Final   • Monocytes Relative 01/27/2023 10  4 - 12 % Final   • Eosinophils Relative 01/27/2023 1  0 - 6 % Final   • Basophils Relative 01/27/2023 1  0 - 1 % Final   • Neutrophils Absolute 01/27/2023 3 81  1 85 - 7 62 Thousands/µL Final   • Immature Grans Absolute 01/27/2023 0 03  0 00 - 0 20 Thousand/uL Final   • Lymphocytes Absolute 01/27/2023 1 92  0 60 - 4 47 Thousands/µL Final   • Monocytes Absolute 01/27/2023 0 68  0 17 - 1 22 Thousand/µL Final   • Eosinophils Absolute 01/27/2023 0 07  0 00 - 0 61 Thousand/µL Final   • Basophils Absolute 01/27/2023 0 05  0 00 - 0 10 Thousands/µL Final

## 2023-03-10 NOTE — PATIENT INSTRUCTIONS
Wellness Visit for Adults   AMBULATORY CARE:   A wellness visit  is when you see your healthcare provider to get screened for health problems  Your healthcare provider will also give you advice on how to stay healthy  Write down your questions so you remember to ask them  Ask your healthcare provider how often you should have a wellness visit  What happens at a wellness visit:  Your healthcare provider will ask about your health, and your family history of health problems  This includes high blood pressure, heart disease, and cancer  He or she will ask if you have symptoms that concern you, if you smoke, and about your mood  You may also be asked about your intake of medicines, supplements, food, and alcohol  Any of the following may be done: Your weight  will be checked  Your height may also be checked so your body mass index (BMI) can be calculated  Your BMI shows if you are at a healthy weight  Your blood pressure  and heart rate will be checked  Your temperature may also be checked  Blood and urine tests  may be done  Blood tests may be done to check your cholesterol levels  Abnormal cholesterol levels increase your risk for heart disease and stroke  You may also need a blood or urine test to check for diabetes if you are at increased risk  Urine tests may be done to look for signs of an infection or kidney disease  A physical exam  includes checking your heartbeat and lungs with a stethoscope  Your healthcare provider may also check your skin to look for sun damage  Screening tests  may be recommended  A screening test is done to check for diseases that may not cause symptoms  The screening tests you may need depend on your age, gender, family history, and lifestyle habits  For example, colorectal screening may be recommended if you are 48years old or older  Screening tests you need if you are a woman:   A Pap smear  is used to screen for cervical cancer   Pap smears are usually done every 3 to 5 years depending on your age  You may need them more often if you have had abnormal Pap smear test results in the past  Ask your healthcare provider how often you should have a Pap smear  A mammogram  is an x-ray of your breasts to screen for breast cancer  Experts recommend mammograms every 2 years starting at age 48 years  You may need a mammogram at age 52 years or younger if you have an increased risk for breast cancer  Talk to your healthcare provider about when you should start having mammograms and how often you need them  Vaccines you may need:   Get an influenza vaccine  every year  The influenza vaccine protects you from the flu  Several types of viruses cause the flu  The viruses change over time, so new vaccines are made each year  Get a tetanus-diphtheria (Td) booster vaccine  every 10 years  This vaccine protects you against tetanus and diphtheria  Tetanus is a severe infection that may cause painful muscle spasms and lockjaw  Diphtheria is a severe bacterial infection that causes a thick covering in the back of your mouth and throat  Get a human papillomavirus (HPV) vaccine  if you are female and aged 23 to 32 or male 23 to 24 and never received it  This vaccine protects you from HPV infection  HPV is the most common infection spread by sexual contact  HPV may also cause vaginal, penile, and anal cancers  Get a pneumococcal vaccine  if you are aged 72 years or older  The pneumococcal vaccine is an injection given to protect you from pneumococcal disease  Pneumococcal disease is an infection caused by pneumococcal bacteria  The infection may cause pneumonia, meningitis, or an ear infection  Get a shingles vaccine  if you are 60 or older, even if you have had shingles before  The shingles vaccine is an injection to protect you from the varicella-zoster virus  This is the same virus that causes chickenpox   Shingles is a painful rash that develops in people who had chickenpox or have been exposed to the virus  How to eat healthy:  My Plate is a model for planning healthy meals  It shows the types and amounts of foods that should go on your plate  Fruits and vegetables make up about half of your plate, and grains and protein make up the other half  A serving of dairy is included on the side of your plate  The amount of calories and serving sizes you need depends on your age, gender, weight, and height  Examples of healthy foods are listed below:  Eat a variety of vegetables  such as dark green, red, and orange vegetables  You can also include canned vegetables low in sodium (salt) and frozen vegetables without added butter or sauces  Eat a variety of fresh fruits , canned fruit in 100% juice, frozen fruit, and dried fruit  Include whole grains  At least half of the grains you eat should be whole grains  Examples include whole-wheat bread, wheat pasta, brown rice, and whole-grain cereals such as oatmeal     Eat a variety of protein foods such as seafood (fish and shellfish), lean meat, and poultry without skin (turkey and chicken)  Examples of lean meats include pork leg, shoulder, or tenderloin, and beef round, sirloin, tenderloin, and extra lean ground beef  Other protein foods include eggs and egg substitutes, beans, peas, soy products, nuts, and seeds  Choose low-fat dairy products such as skim or 1% milk or low-fat yogurt, cheese, and cottage cheese  Limit unhealthy fats  such as butter, hard margarine, and shortening  Exercise:  Exercise at least 30 minutes per day on most days of the week  Some examples of exercise include walking, biking, dancing, and swimming  You can also fit in more physical activity by taking the stairs instead of the elevator or parking farther away from stores  Include muscle strengthening activities 2 days each week  Regular exercise provides many health benefits   It helps you manage your weight, and decreases your risk for type 2 diabetes, heart disease, stroke, and high blood pressure  Exercise can also help improve your mood  Ask your healthcare provider about the best exercise plan for you  General health and safety guidelines:   Do not smoke  Nicotine and other chemicals in cigarettes and cigars can cause lung damage  Ask your healthcare provider for information if you currently smoke and need help to quit  E-cigarettes or smokeless tobacco still contain nicotine  Talk to your healthcare provider before you use these products  Limit alcohol  A drink of alcohol is 12 ounces of beer, 5 ounces of wine, or 1½ ounces of liquor  Lose weight, if needed  Being overweight increases your risk of certain health conditions  These include heart disease, high blood pressure, type 2 diabetes, and certain types of cancer  Protect your skin  Do not sunbathe or use tanning beds  Use sunscreen with a SPF 15 or higher  Apply sunscreen at least 15 minutes before you go outside  Reapply sunscreen every 2 hours  Wear protective clothing, hats, and sunglasses when you are outside  Drive safely  Always wear your seatbelt  Make sure everyone in your car wears a seatbelt  A seatbelt can save your life if you are in an accident  Do not use your cell phone when you are driving  This could distract you and cause an accident  Pull over if you need to make a call or send a text message  Practice safe sex  Use latex condoms if are sexually active and have more than one partner  Your healthcare provider may recommend screening tests for sexually transmitted infections (STIs)  Wear helmets, lifejackets, and protective gear  Always wear a helmet when you ride a bike or motorcycle, go skiing, or play sports that could cause a head injury  Wear protective equipment when you play sports  Wear a lifejacket when you are on a boat or doing water sports      © Copyright [facility] 2022 Information is for End User's use only and may not be sold, redistributed or otherwise used for commercial purposes  The above information is an  only  It is not intended as medical advice for individual conditions or treatments  Talk to your doctor, nurse or pharmacist before following any medical regimen to see if it is safe and effective for you

## 2023-04-30 ENCOUNTER — PATIENT MESSAGE (OUTPATIENT)
Dept: INTERNAL MEDICINE CLINIC | Age: 23
End: 2023-04-30

## 2023-05-03 ENCOUNTER — APPOINTMENT (OUTPATIENT)
Dept: LAB | Age: 23
End: 2023-05-03

## 2023-05-03 ENCOUNTER — OFFICE VISIT (OUTPATIENT)
Dept: INTERNAL MEDICINE CLINIC | Age: 23
End: 2023-05-03

## 2023-05-03 VITALS
WEIGHT: 152 LBS | TEMPERATURE: 97.9 F | HEIGHT: 74 IN | BODY MASS INDEX: 19.51 KG/M2 | OXYGEN SATURATION: 98 % | DIASTOLIC BLOOD PRESSURE: 62 MMHG | SYSTOLIC BLOOD PRESSURE: 116 MMHG | HEART RATE: 72 BPM

## 2023-05-03 DIAGNOSIS — R53.83 OTHER FATIGUE: ICD-10-CM

## 2023-05-03 DIAGNOSIS — Z13.228 SCREENING FOR METABOLIC DISORDER: ICD-10-CM

## 2023-05-03 DIAGNOSIS — E55.9 VITAMIN D DEFICIENCY: ICD-10-CM

## 2023-05-03 DIAGNOSIS — S22.42XD CLOSED FRACTURE OF MULTIPLE RIBS OF LEFT SIDE WITH ROUTINE HEALING, SUBSEQUENT ENCOUNTER: ICD-10-CM

## 2023-05-03 DIAGNOSIS — S22.42XD CLOSED FRACTURE OF MULTIPLE RIBS OF LEFT SIDE WITH ROUTINE HEALING, SUBSEQUENT ENCOUNTER: Primary | ICD-10-CM

## 2023-05-03 LAB
25(OH)D3 SERPL-MCNC: 33.4 NG/ML (ref 30–100)
ALBUMIN SERPL BCP-MCNC: 4.6 G/DL (ref 3.5–5)
ALP SERPL-CCNC: 77 U/L (ref 46–116)
ALT SERPL W P-5'-P-CCNC: 25 U/L (ref 12–78)
ANION GAP SERPL CALCULATED.3IONS-SCNC: 3 MMOL/L (ref 4–13)
AST SERPL W P-5'-P-CCNC: 12 U/L (ref 5–45)
BASOPHILS # BLD AUTO: 0.05 THOUSANDS/ÂΜL (ref 0–0.1)
BASOPHILS NFR BLD AUTO: 1 % (ref 0–1)
BILIRUB SERPL-MCNC: 0.47 MG/DL (ref 0.2–1)
BUN SERPL-MCNC: 11 MG/DL (ref 5–25)
CALCIUM SERPL-MCNC: 10.1 MG/DL (ref 8.3–10.1)
CHLORIDE SERPL-SCNC: 105 MMOL/L (ref 96–108)
CO2 SERPL-SCNC: 29 MMOL/L (ref 21–32)
CREAT SERPL-MCNC: 0.84 MG/DL (ref 0.6–1.3)
EOSINOPHIL # BLD AUTO: 0.09 THOUSAND/ÂΜL (ref 0–0.61)
EOSINOPHIL NFR BLD AUTO: 2 % (ref 0–6)
ERYTHROCYTE [DISTWIDTH] IN BLOOD BY AUTOMATED COUNT: 12 % (ref 11.6–15.1)
GFR SERPL CREATININE-BSD FRML MDRD: 124 ML/MIN/1.73SQ M
GLUCOSE P FAST SERPL-MCNC: 87 MG/DL (ref 65–99)
HCT VFR BLD AUTO: 45.5 % (ref 36.5–49.3)
HGB BLD-MCNC: 15.3 G/DL (ref 12–17)
IMM GRANULOCYTES # BLD AUTO: 0.01 THOUSAND/UL (ref 0–0.2)
IMM GRANULOCYTES NFR BLD AUTO: 0 % (ref 0–2)
LYMPHOCYTES # BLD AUTO: 1.81 THOUSANDS/ÂΜL (ref 0.6–4.47)
LYMPHOCYTES NFR BLD AUTO: 34 % (ref 14–44)
MCH RBC QN AUTO: 31.9 PG (ref 26.8–34.3)
MCHC RBC AUTO-ENTMCNC: 33.6 G/DL (ref 31.4–37.4)
MCV RBC AUTO: 95 FL (ref 82–98)
MONOCYTES # BLD AUTO: 0.52 THOUSAND/ÂΜL (ref 0.17–1.22)
MONOCYTES NFR BLD AUTO: 10 % (ref 4–12)
NEUTROPHILS # BLD AUTO: 2.92 THOUSANDS/ÂΜL (ref 1.85–7.62)
NEUTS SEG NFR BLD AUTO: 53 % (ref 43–75)
NRBC BLD AUTO-RTO: 0 /100 WBCS
PLATELET # BLD AUTO: 270 THOUSANDS/UL (ref 149–390)
PMV BLD AUTO: 11 FL (ref 8.9–12.7)
POTASSIUM SERPL-SCNC: 4.4 MMOL/L (ref 3.5–5.3)
PROT SERPL-MCNC: 8 G/DL (ref 6.4–8.4)
RBC # BLD AUTO: 4.8 MILLION/UL (ref 3.88–5.62)
SODIUM SERPL-SCNC: 137 MMOL/L (ref 135–147)
TSH SERPL DL<=0.05 MIU/L-ACNC: 1.33 UIU/ML (ref 0.45–4.5)
WBC # BLD AUTO: 5.4 THOUSAND/UL (ref 4.31–10.16)
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
## 2024-02-21 PROBLEM — Z11.1 SCREENING-PULMONARY TB: Status: RESOLVED | Noted: 2020-02-28 | Resolved: 2024-02-21

## 2024-03-20 ENCOUNTER — OFFICE VISIT (OUTPATIENT)
Dept: URGENT CARE | Age: 24
End: 2024-03-20
Payer: COMMERCIAL

## 2024-03-20 VITALS
DIASTOLIC BLOOD PRESSURE: 78 MMHG | RESPIRATION RATE: 20 BRPM | TEMPERATURE: 97 F | WEIGHT: 170 LBS | HEART RATE: 97 BPM | OXYGEN SATURATION: 99 % | BODY MASS INDEX: 21.83 KG/M2 | SYSTOLIC BLOOD PRESSURE: 118 MMHG

## 2024-03-20 DIAGNOSIS — K52.9 GASTROENTERITIS: Primary | ICD-10-CM

## 2024-03-20 PROCEDURE — 99213 OFFICE O/P EST LOW 20 MIN: CPT | Performed by: EMERGENCY MEDICINE

## 2024-03-20 RX ORDER — FAMOTIDINE 20 MG/1
20 TABLET, FILM COATED ORAL 2 TIMES DAILY
Qty: 14 TABLET | Refills: 0 | Status: SHIPPED | OUTPATIENT
Start: 2024-03-20 | End: 2024-03-27

## 2024-03-20 RX ORDER — ONDANSETRON 8 MG/1
8 TABLET, ORALLY DISINTEGRATING ORAL EVERY 8 HOURS PRN
Qty: 20 TABLET | Refills: 0 | Status: SHIPPED | OUTPATIENT
Start: 2024-03-20

## 2024-03-20 NOTE — PROGRESS NOTES
[facility]        NAME: Nick Pollock is a 23 y.o. male  : 2000    MRN: [id number]  DATE: 2024  TIME: 7:42 PM    Assessment and Plan   Gastroenteritis [K52.9]  1. Gastroenteritis  ondansetron (ZOFRAN-ODT) 8 mg disintegrating tablet    aluminum-magnesium hydroxide 200-200 MG/5ML suspension    famotidine (PEPCID) 20 mg tablet        Patient without focal tenderness to palpation of the abdomen or peritoneal signs.  Is otherwise well-appearing, and afebrile in clinic.  States he is able to tolerate p.o. intake without difficulty.  Will discharge patient with Zofran, Maalox and Pepcid for symptomatic relief.  Advised patient if he develops worsening or persistent symptoms to immediately seek care in the emergency room, otherwise follow-up closely with his PCP as directed.  All questions were answered at bedside, patient was amenable to plan and voiced understanding.    Patient Instructions       Follow up with PCP in 3-5 days.  Proceed to  ER if symptoms worsen.    If tests have been performed at [facility], our office will contact you with results if changes need to be made to the care plan discussed with you at the visit.  [facility]    Chief Complaint     Chief Complaint   Patient presents with    Abdominal Pain     Started Monday with upper abdominal pain which radiates from both sides of abdomen. Pain is intermittent and dull.  Denies nausea, vomiting, diarrhea. Neg hx of kidney stones. Attempted GAS X without relief.  Denies UTI sxs or fevers.           History of Present Illness       23-year-old previously healthy male presents with chief complaint of upper abdominal pain which began approximately 3 days ago.  Patient characterizes pain as sharp and pressure-like without radiation.  He states he has had some loose watery stools and denies bloody stools, or vomiting.  He states he has experienced intermittent nausea.  He denies any fevers,  testicular pain or swelling, penile discharge, dysuria or hematuria.  Has tried taking simethicone with some relief.  States his mother is currently sick and is experiencing similar symptoms.  He denies any suspected contaminated food intake.    Abdominal Pain  This is a new problem. The current episode started in the past 7 days. The onset quality is sudden. The problem occurs intermittently. The problem has been waxing and waning. The pain is located in the generalized abdominal region. The pain is at a severity of 2/10. The pain is mild. The quality of the pain is aching and sharp. The abdominal pain does not radiate. Associated symptoms include diarrhea and nausea. Pertinent negatives include no anorexia, arthralgias, belching, constipation, dysuria, fever, flatus, frequency, headaches, hematochezia, hematuria, melena, myalgias, vomiting or weight loss. Nothing aggravates the pain. The pain is relieved by Nothing. He has tried antacids for the symptoms. The treatment provided mild relief.       Review of Systems   Review of Systems   Constitutional:  Negative for activity change, appetite change, chills, diaphoresis, fatigue, fever, unexpected weight change and weight loss.   HENT:  Negative for congestion, dental problem, drooling, ear discharge, ear pain, facial swelling, hearing loss, mouth sores, nosebleeds, postnasal drip, rhinorrhea, sinus pressure, sinus pain, sneezing, sore throat, tinnitus, trouble swallowing and voice change.    Eyes:  Negative for photophobia, pain, discharge, redness, itching and visual disturbance.   Respiratory:  Negative for apnea, cough, choking, chest tightness, shortness of breath, wheezing and stridor.    Cardiovascular:  Negative for chest pain, palpitations and leg swelling.   Gastrointestinal:  Positive for abdominal pain, diarrhea and nausea. Negative for abdominal distention, anal bleeding, anorexia, blood in stool, constipation, flatus, hematochezia, melena, rectal pain  and vomiting.   Genitourinary:  Negative for decreased urine volume, difficulty urinating, dysuria, enuresis, flank pain, frequency, genital sores, hematuria, penile discharge, penile pain, penile swelling, scrotal swelling, testicular pain and urgency.   Musculoskeletal:  Negative for arthralgias, back pain, gait problem, joint swelling, myalgias, neck pain and neck stiffness.   Skin:  Negative for color change and rash.   Neurological:  Negative for dizziness, tremors, seizures, syncope, facial asymmetry, speech difficulty, weakness, light-headedness, numbness and headaches.   All other systems reviewed and are negative.        Current Medications       Current Outpatient Medications:     aluminum-magnesium hydroxide 200-200 MG/5ML suspension, Take 15 mL by mouth every 6 (six) hours as needed for heartburn, cramping or indigestion, Disp: 355 mL, Rfl: 0    famotidine (PEPCID) 20 mg tablet, Take 1 tablet (20 mg total) by mouth 2 (two) times a day for 7 days, Disp: 14 tablet, Rfl: 0    ondansetron (ZOFRAN-ODT) 8 mg disintegrating tablet, Take 1 tablet (8 mg total) by mouth every 8 (eight) hours as needed for nausea or vomiting, Disp: 20 tablet, Rfl: 0    ibuprofen (MOTRIN) 600 mg tablet, Take 1 tablet (600 mg total) by mouth every 6 (six) hours as needed for mild pain (Patient not taking: Reported on 3/20/2024), Disp: 30 tablet, Rfl: 0    lidocaine (Lidoderm) 5 %, Apply 1 patch topically over 12 hours daily Remove & Discard patch within 12 hours or as directed by MD (Patient not taking: Reported on 5/3/2023), Disp: 7 patch, Rfl: 0    methocarbamol (ROBAXIN) 500 mg tablet, Take 1 tablet (500 mg total) by mouth 2 (two) times a day (Patient not taking: Reported on 5/3/2023), Disp: 20 tablet, Rfl: 0    Current Allergies     Allergies as of 03/20/2024 - Reviewed 03/20/2024   Allergen Reaction Noted    Grass extracts [gramineae pollens]  02/28/2020            The following portions of the patient's history were reviewed  and updated as appropriate: allergies, current medications, past family history, past medical history, past social history, past surgical history and problem list.     History reviewed. No pertinent past medical history.    History reviewed. No pertinent surgical history.    Family History   Problem Relation Age of Onset    Hypertension Mother     Hypertension Father     Hypertension Maternal Grandmother     Diabetes Maternal Grandmother     Heart disease Maternal Grandmother     Hypertension Maternal Grandfather     Diabetes Maternal Grandfather     Heart disease Maternal Grandfather     Diabetes Paternal Grandmother     Diabetes Paternal Grandfather          Medications have been verified.        Objective   /78 (BP Location: Right arm, Patient Position: Sitting, Cuff Size: Standard)   Pulse 97   Temp (!) 97 °F (36.1 °C) (Tympanic)   Resp 20   Wt 77.1 kg (170 lb)   SpO2 99%   BMI 21.83 kg/m²   No LMP for male patient.       Physical Exam     Physical Exam  Vitals and nursing note reviewed.   Constitutional:       General: He is not in acute distress.     Appearance: Normal appearance. He is obese. He is not ill-appearing, toxic-appearing or diaphoretic.   HENT:      Head: Normocephalic and atraumatic.      Right Ear: External ear normal.      Left Ear: External ear normal.      Nose: Nose normal.      Mouth/Throat:      Mouth: Mucous membranes are moist.      Pharynx: Oropharynx is clear. No pharyngeal swelling or oropharyngeal exudate.   Eyes:      General: No scleral icterus.     Extraocular Movements: Extraocular movements intact.      Pupils: Pupils are equal, round, and reactive to light.   Cardiovascular:      Rate and Rhythm: Normal rate and regular rhythm.      Pulses: Normal pulses.      Heart sounds: Normal heart sounds.   Pulmonary:      Effort: Pulmonary effort is normal. No respiratory distress.      Breath sounds: Normal breath sounds. No stridor. No wheezing, rhonchi or rales.   Chest:       Chest wall: No tenderness.   Abdominal:      General: Abdomen is flat. There is no distension.      Palpations: Abdomen is soft. There is no mass.      Tenderness: There is no abdominal tenderness. There is no right CVA tenderness, left CVA tenderness, guarding or rebound. Negative signs include Lal's sign, Rovsing's sign, McBurney's sign, psoas sign and obturator sign.      Hernia: No hernia is present.   Genitourinary:     Testes: Normal. Cremasteric reflex is present.         Right: Mass, tenderness or swelling not present.         Left: Mass, tenderness or swelling not present.   Musculoskeletal:         General: No swelling, tenderness, deformity or signs of injury.      Cervical back: Neck supple.      Left lower leg: No edema.   Skin:     General: Skin is warm and dry.      Capillary Refill: Capillary refill takes less than 2 seconds.   Neurological:      General: No focal deficit present.      Mental Status: He is alert and oriented to person, place, and time.      Cranial Nerves: No cranial nerve deficit.      Motor: No weakness.      Coordination: Coordination normal.      Gait: Gait normal.      Deep Tendon Reflexes: Reflexes normal.   Psychiatric:         Mood and Affect: Mood normal.         Behavior: Behavior normal.

## 2025-08-07 ENCOUNTER — OFFICE VISIT (OUTPATIENT)
Dept: INTERNAL MEDICINE CLINIC | Age: 25
End: 2025-08-07
Payer: COMMERCIAL

## 2025-08-07 VITALS
DIASTOLIC BLOOD PRESSURE: 62 MMHG | BODY MASS INDEX: 22.07 KG/M2 | SYSTOLIC BLOOD PRESSURE: 128 MMHG | HEIGHT: 74 IN | TEMPERATURE: 97.9 F | HEART RATE: 70 BPM | OXYGEN SATURATION: 99 % | WEIGHT: 172 LBS

## 2025-08-07 DIAGNOSIS — Z00.00 ANNUAL PHYSICAL EXAM: Primary | ICD-10-CM

## 2025-08-07 PROCEDURE — 99395 PREV VISIT EST AGE 18-39: CPT | Performed by: PHYSICIAN ASSISTANT
